# Patient Record
Sex: FEMALE | Race: WHITE | NOT HISPANIC OR LATINO | Employment: UNEMPLOYED | URBAN - METROPOLITAN AREA
[De-identification: names, ages, dates, MRNs, and addresses within clinical notes are randomized per-mention and may not be internally consistent; named-entity substitution may affect disease eponyms.]

---

## 2023-10-27 PROBLEM — G90.1 DYSAUTONOMIA (MULTI): Status: ACTIVE | Noted: 2023-10-27

## 2023-10-27 RX ORDER — SENNOSIDES 8.6 MG/1
1 TABLET ORAL DAILY
COMMUNITY

## 2023-10-27 RX ORDER — CHOLECALCIFEROL (VITAMIN D3) 25 MCG
1 TABLET ORAL DAILY
COMMUNITY

## 2023-10-27 RX ORDER — POLYETHYLENE GLYCOL 3350 17 G/17G
17 POWDER, FOR SOLUTION ORAL DAILY
COMMUNITY

## 2023-10-27 RX ORDER — DROXIDOPA 100 MG/1
1 CAPSULE ORAL DAILY
COMMUNITY
Start: 2023-01-27

## 2023-10-27 RX ORDER — CALC/MAG/B COMPLEX/D3/HERB 61
1 TABLET ORAL DAILY
COMMUNITY

## 2023-10-27 RX ORDER — CALCIUM CARBONATE 300MG(750)
1 TABLET,CHEWABLE ORAL DAILY
COMMUNITY

## 2023-10-27 RX ORDER — MELOXICAM 15 MG/1
1 TABLET ORAL DAILY
COMMUNITY
Start: 2023-10-03

## 2023-10-27 RX ORDER — ONDANSETRON 4 MG/1
1 TABLET, ORALLY DISINTEGRATING ORAL EVERY 6 HOURS
COMMUNITY
Start: 2022-12-20

## 2023-10-27 RX ORDER — LUBIPROSTONE 24 UG/1
1 CAPSULE ORAL
COMMUNITY
Start: 2023-10-18

## 2023-10-27 RX ORDER — HYDROXYCHLOROQUINE SULFATE 200 MG/1
1 TABLET, FILM COATED ORAL DAILY
COMMUNITY
Start: 2023-10-11

## 2023-10-27 RX ORDER — IVABRADINE 5 MG/1
1 TABLET, FILM COATED ORAL DAILY
COMMUNITY
Start: 2023-09-06

## 2023-10-27 RX ORDER — OSELTAMIVIR PHOSPHATE 75 MG/1
1 CAPSULE ORAL 2 TIMES DAILY
COMMUNITY
Start: 2022-12-20

## 2023-10-27 RX ORDER — PYRIDOSTIGMINE BROMIDE 60 MG/1
1 TABLET ORAL 2 TIMES DAILY
COMMUNITY
Start: 2023-10-22

## 2023-10-30 ENCOUNTER — PROCEDURE VISIT (OUTPATIENT)
Dept: OTOLARYNGOLOGY | Facility: CLINIC | Age: 18
End: 2023-10-30
Payer: COMMERCIAL

## 2023-10-30 ENCOUNTER — LAB (OUTPATIENT)
Dept: LAB | Facility: LAB | Age: 18
End: 2023-10-30
Payer: COMMERCIAL

## 2023-10-30 VITALS — SYSTOLIC BLOOD PRESSURE: 107 MMHG | TEMPERATURE: 98.3 F | DIASTOLIC BLOOD PRESSURE: 69 MMHG | HEART RATE: 83 BPM

## 2023-10-30 DIAGNOSIS — G90.1 DYSAUTONOMIA (MULTI): ICD-10-CM

## 2023-10-30 DIAGNOSIS — G90.1 DYSAUTONOMIA (MULTI): Primary | ICD-10-CM

## 2023-10-30 LAB
ANION GAP SERPL CALC-SCNC: 13 MMOL/L (ref 10–20)
BASOPHILS # BLD AUTO: 0.06 X10*3/UL (ref 0–0.1)
BASOPHILS NFR BLD AUTO: 1.4 %
BUN SERPL-MCNC: 9 MG/DL (ref 6–23)
CALCIUM SERPL-MCNC: 10.6 MG/DL (ref 8.6–10.6)
CENTROMERE B AB SER-ACNC: <0.2 AI
CERULOPLASMIN SERPL-MCNC: 25.9 MG/DL (ref 20–60)
CHLORIDE SERPL-SCNC: 106 MMOL/L (ref 98–107)
CHROMATIN AB SERPL-ACNC: <0.2 AI
CO2 SERPL-SCNC: 27 MMOL/L (ref 21–32)
CREAT SERPL-MCNC: 0.7 MG/DL (ref 0.5–1.05)
CRP SERPL-MCNC: <0.1 MG/DL
DSDNA AB SER-ACNC: 1 IU/ML
ENA JO1 AB SER QL IA: <0.2 AI
ENA RNP AB SER IA-ACNC: 0.2 AI
ENA SCL70 AB SER QL IA: <0.2 AI
ENA SM AB SER IA-ACNC: <0.2 AI
ENA SM+RNP AB SER QL IA: <0.2 AI
ENA SS-A AB SER IA-ACNC: 0.3 AI
ENA SS-B AB SER IA-ACNC: <0.2 AI
EOSINOPHIL # BLD AUTO: 0.15 X10*3/UL (ref 0–0.7)
EOSINOPHIL NFR BLD AUTO: 3.5 %
ERYTHROCYTE [DISTWIDTH] IN BLOOD BY AUTOMATED COUNT: 12.3 % (ref 11.5–14.5)
ERYTHROCYTE [SEDIMENTATION RATE] IN BLOOD BY WESTERGREN METHOD: 4 MM/H (ref 0–20)
GFR SERPL CREATININE-BSD FRML MDRD: >90 ML/MIN/1.73M*2
GLIADIN PEPTIDE IGA SER IA-ACNC: <1 U/ML
GLIADIN PEPTIDE IGG SER IA-ACNC: 1.6 U/ML
GLUCOSE P FAST SERPL-MCNC: 82 MG/DL
GLUCOSE SERPL-MCNC: 82 MG/DL (ref 74–99)
HCT VFR BLD AUTO: 38.8 % (ref 36–46)
HGB BLD-MCNC: 13.1 G/DL (ref 12–16)
IMM GRANULOCYTES # BLD AUTO: 0.01 X10*3/UL (ref 0–0.7)
IMM GRANULOCYTES NFR BLD AUTO: 0.2 % (ref 0–0.9)
LYMPHOCYTES # BLD AUTO: 1.27 X10*3/UL (ref 1.2–4.8)
LYMPHOCYTES NFR BLD AUTO: 29.3 %
MCH RBC QN AUTO: 31.2 PG (ref 26–34)
MCHC RBC AUTO-ENTMCNC: 33.8 G/DL (ref 32–36)
MCV RBC AUTO: 92 FL (ref 80–100)
MONOCYTES # BLD AUTO: 0.41 X10*3/UL (ref 0.1–1)
MONOCYTES NFR BLD AUTO: 9.5 %
NEUTROPHILS # BLD AUTO: 2.43 X10*3/UL (ref 1.2–7.7)
NEUTROPHILS NFR BLD AUTO: 56.1 %
NRBC BLD-RTO: 0 /100 WBCS (ref 0–0)
PLATELET # BLD AUTO: 318 X10*3/UL (ref 150–450)
PMV BLD AUTO: 10.2 FL (ref 7.5–11.5)
POTASSIUM SERPL-SCNC: 5 MMOL/L (ref 3.5–5.3)
PROT SERPL-MCNC: 7.4 G/DL (ref 6.4–8.2)
PROT UR-ACNC: <4 MG/DL (ref 5–25)
RBC # BLD AUTO: 4.2 X10*6/UL (ref 4–5.2)
RHEUMATOID FACT SER NEPH-ACNC: <10 IU/ML (ref 0–15)
RIBOSOMAL P AB SER-ACNC: <0.2 AI
SODIUM SERPL-SCNC: 141 MMOL/L (ref 136–145)
T3FREE SERPL-MCNC: 3.7 PG/ML (ref 3–4.7)
T4 SERPL-MCNC: 8.6 UG/DL (ref 4.5–11.1)
TTG IGA SER IA-ACNC: <1 U/ML
TTG IGG SER IA-ACNC: <1 U/ML
VIT B12 SERPL-MCNC: 981 PG/ML (ref 211–911)
WBC # BLD AUTO: 4.3 X10*3/UL (ref 4.4–11.3)

## 2023-10-30 PROCEDURE — 84155 ASSAY OF PROTEIN SERUM: CPT

## 2023-10-30 PROCEDURE — 86225 DNA ANTIBODY NATIVE: CPT

## 2023-10-30 PROCEDURE — 82542 COL CHROMOTOGRAPHY QUAL/QUAN: CPT

## 2023-10-30 PROCEDURE — 83921 ORGANIC ACID SINGLE QUANT: CPT

## 2023-10-30 PROCEDURE — 86258 DGP ANTIBODY EACH IG CLASS: CPT

## 2023-10-30 PROCEDURE — 82390 ASSAY OF CERULOPLASMIN: CPT

## 2023-10-30 PROCEDURE — 86376 MICROSOMAL ANTIBODY EACH: CPT

## 2023-10-30 PROCEDURE — 84166 PROTEIN E-PHORESIS/URINE/CSF: CPT

## 2023-10-30 PROCEDURE — 86334 IMMUNOFIX E-PHORESIS SERUM: CPT

## 2023-10-30 PROCEDURE — 86738 MYCOPLASMA ANTIBODY: CPT

## 2023-10-30 PROCEDURE — 86800 THYROGLOBULIN ANTIBODY: CPT

## 2023-10-30 PROCEDURE — 85652 RBC SED RATE AUTOMATED: CPT

## 2023-10-30 PROCEDURE — 84165 PROTEIN E-PHORESIS SERUM: CPT | Performed by: PSYCHIATRY & NEUROLOGY

## 2023-10-30 PROCEDURE — 82384 ASSAY THREE CATECHOLAMINES: CPT

## 2023-10-30 PROCEDURE — 82607 VITAMIN B-12: CPT

## 2023-10-30 PROCEDURE — 86320 SERUM IMMUNOELECTROPHORESIS: CPT | Performed by: PSYCHIATRY & NEUROLOGY

## 2023-10-30 PROCEDURE — 86255 FLUORESCENT ANTIBODY SCREEN: CPT

## 2023-10-30 PROCEDURE — 80048 BASIC METABOLIC PNL TOTAL CA: CPT

## 2023-10-30 PROCEDURE — 84446 ASSAY OF VITAMIN E: CPT

## 2023-10-30 PROCEDURE — 84481 FREE ASSAY (FT-3): CPT

## 2023-10-30 PROCEDURE — 81260 IKBKAP GENE: CPT

## 2023-10-30 PROCEDURE — 84207 ASSAY OF VITAMIN B-6: CPT

## 2023-10-30 PROCEDURE — 86431 RHEUMATOID FACTOR QUANT: CPT

## 2023-10-30 PROCEDURE — 86235 NUCLEAR ANTIGEN ANTIBODY: CPT

## 2023-10-30 PROCEDURE — 83519 RIA NONANTIBODY: CPT

## 2023-10-30 PROCEDURE — 85025 COMPLETE CBC W/AUTO DIFF WBC: CPT

## 2023-10-30 PROCEDURE — 99203 OFFICE O/P NEW LOW 30 MIN: CPT | Performed by: OTOLARYNGOLOGY

## 2023-10-30 PROCEDURE — 83918 ORGANIC ACIDS TOTAL QUANT: CPT

## 2023-10-30 PROCEDURE — 88305 TISSUE EXAM BY PATHOLOGIST: CPT

## 2023-10-30 PROCEDURE — 86140 C-REACTIVE PROTEIN: CPT

## 2023-10-30 PROCEDURE — 36415 COLL VENOUS BLD VENIPUNCTURE: CPT

## 2023-10-30 PROCEDURE — 84436 ASSAY OF TOTAL THYROXINE: CPT

## 2023-10-30 PROCEDURE — 86038 ANTINUCLEAR ANTIBODIES: CPT

## 2023-10-30 PROCEDURE — 84165 PROTEIN E-PHORESIS SERUM: CPT

## 2023-10-30 PROCEDURE — 86364 TISS TRNSGLTMNASE EA IG CLAS: CPT

## 2023-10-30 PROCEDURE — 86335 IMMUNFIX E-PHORSIS/URINE/CSF: CPT

## 2023-10-30 PROCEDURE — 82595 ASSAY OF CRYOGLOBULIN: CPT

## 2023-10-30 PROCEDURE — 84156 ASSAY OF PROTEIN URINE: CPT

## 2023-10-30 PROCEDURE — 82085 ASSAY OF ALDOLASE: CPT

## 2023-10-30 PROCEDURE — 86325 OTHER IMMUNOELECTROPHORESIS: CPT

## 2023-10-31 ENCOUNTER — TELEMEDICINE (OUTPATIENT)
Dept: NEUROLOGY | Facility: HOSPITAL | Age: 18
End: 2023-10-31
Payer: COMMERCIAL

## 2023-10-31 ENCOUNTER — HOSPITAL ENCOUNTER (OUTPATIENT)
Dept: NEUROLOGY | Facility: HOSPITAL | Age: 18
Discharge: HOME | End: 2023-10-31
Payer: COMMERCIAL

## 2023-10-31 VITALS — BODY MASS INDEX: 19.41 KG/M2 | WEIGHT: 123.02 LBS

## 2023-10-31 DIAGNOSIS — G90.1 DYSAUTONOMIA (MULTI): ICD-10-CM

## 2023-10-31 LAB
ALDOLASE SERPL-CCNC: 3.2 U/L (ref 1.2–7.6)
ANA SER QL HEP2 SUBST: NEGATIVE
THYROGLOB AB SERPL-ACNC: <0.9 IU/ML (ref 0–4)
THYROPEROXIDASE AB SERPL-ACNC: <28 IU/ML

## 2023-10-31 PROCEDURE — 11104 PUNCH BX SKIN SINGLE LESION: CPT | Performed by: PSYCHIATRY & NEUROLOGY

## 2023-10-31 PROCEDURE — 95924 ANS PARASYMP & SYMP W/TILT: CPT | Performed by: PSYCHIATRY & NEUROLOGY

## 2023-10-31 PROCEDURE — 95912 NRV CNDJ TEST 11-12 STUDIES: CPT | Performed by: PSYCHIATRY & NEUROLOGY

## 2023-10-31 PROCEDURE — 11105 PUNCH BX SKIN EA SEP/ADDL: CPT | Performed by: PSYCHIATRY & NEUROLOGY

## 2023-10-31 PROCEDURE — 95923 AUTONOMIC NRV SYST FUNJ TEST: CPT | Performed by: PSYCHIATRY & NEUROLOGY

## 2023-10-31 PROCEDURE — 95886 MUSC TEST DONE W/N TEST COMP: CPT | Performed by: PSYCHIATRY & NEUROLOGY

## 2023-10-31 PROCEDURE — 2500000005 HC RX 250 GENERAL PHARMACY W/O HCPCS: Performed by: PSYCHIATRY & NEUROLOGY

## 2023-10-31 RX ORDER — LIDOCAINE HYDROCHLORIDE AND EPINEPHRINE 20; 10 MG/ML; UG/ML
1 INJECTION, SOLUTION INFILTRATION; PERINEURAL SEE ADMIN INSTRUCTIONS
Status: SHIPPED | OUTPATIENT
Start: 2023-10-31

## 2023-10-31 RX ADMIN — LIDOCAINE HYDROCHLORIDE,EPINEPHRINE BITARTRATE 1 ML: 20; .01 INJECTION, SOLUTION INFILTRATION; PERINEURAL at 16:02

## 2023-10-31 ASSESSMENT — PAIN SCALES - GENERAL: PAINLEVEL_OUTOF10: 0 - NO PAIN

## 2023-10-31 NOTE — PROGRESS NOTES
ENT Outpatient Consultation    Chief Complaint: lip biopsy to rule out sjogrens  History Of Present Illness  Ana Barnett is a 18 y.o. female presents for lower lip biopsy to rule out Sjogren's disease.  She is from [place] and is in town to undergo evaluation by Dr. Mojica.  He requested lower lip biopsy as part of his work-up.  She denies any drug allergies.     Past Medical History  She has no past medical history on file.  Patient Active Problem List   Diagnosis    Dysautonomia (CMS/HCC)      Surgical History  She has no past surgical history on file.     Social History  She has no history on file for tobacco use, alcohol use, and drug use.    Family History  Family History   Family history unknown: Yes        Allergies  Patient has no known allergies.     Physical Exam:  CONSTITUTIONAL:  No acute distress  VOICE:  No hoarseness or other abnormality  RESPIRATION:  Breathing comfortably, no stridor  CV:  No clubbing/cyanosis/edema in hands  NEURO:  Alert and oriented times 3, Cranial nerves II-XII grossly intact and symmetric bilaterally  HEAD AND FACE:  Symmetric facial features, no masses or lesions, sinuses non-tender to palpation  SALIVARY GLANDS:  Parotid and submandibular glands normal bilaterally  EARS:  Normal external ears  NOSE:  External nose midline, anterior rhinoscopy is normal with limited visualization to the anterior aspect of the interior turbinates, no bleeding or drainage, no lesions  ORAL CAVITY/OROPHARYNX/LIPS:  Normal mucous membranes, normal floor of mouth/tongue/OP, no masses or lesions  PHARYNGEAL WALLS:  No masses or lesions  NECK/LYMPH:  No LAD    Procedure: Biopsy of lower lip    The lower lip was injected with 1% lidocaine and 1 100,000 epinephrine.  After local anesthesia took effect curved iris scissors were used to cut the mucosa.  Several clusters of minor salivary gland tissue were dissected and placed in formalin.  3-0 Vicryl was used to reapproximate the mucosa.  Patient  tolerated the procedure well.  She had mild nausea and dizziness at the end of the procedure however this resolved prior to her leaving the office     Last Recorded Vitals  Blood pressure 107/69, pulse 83, temperature 36.8 °C (98.3 °F).      Assessment and Plan  18 y.o. female  referred for biopsy of her lower lip to rule out Sjogren's disease.  Biopsy obtained today.  I will notify her once pathology is final.  She will follow-up with Dr. Mojica to discuss further    Yakov Jones MD

## 2023-10-31 NOTE — PROGRESS NOTES
Skin Biopsy Procedure Note      Ana Barnett [id number] presented today for skin biopsy to quantify and compare epidermal nerve fiber density from 2 proximal sites (hip and distal thigh) with a distal site (ankle).   Indications: Symptoms of small fiber neuropathy   Potential risks were explained: Including but not limited to excessive bleeding, discomfort at injection site, and infection   Procedure: The patient was placed in the left lateral recumbent position. The skin was prepped with Chlorhexidine in 3 locations, all on the right side: 10 cm proximal to lateral malleolus, 7 cm proximal to the knee and 7 cm distal to the greater trochanter. Local anesthesia was achieved with xylocaine 2% with epinephrine. The 3 punch biopsies were then taken without difficulty, and placed in fixative solution. The biopsy sites were dried using sterile gauze. Band-aids were applied at all sites. The specimens were shipped via [employer] the same afternoon to Dr. Chau Gonzalez at [facility].  Complications: There were no immediate complications.   Results: Will be available in 21-30 days and will be scanned in the chart.   Referring Provider, Leah Mojica MD  [id number]  [place] [address] will be notified at that time via fax and/or in basket message.    Leah Urrutia MD

## 2023-11-01 LAB
M PNEUMO IGG SER IA-ACNC: 0.08 U/L
M PNEUMO IGM SER IA-ACNC: 0.07 U/L
PCA IGG SER-ACNC: 6.1 UNITS (ref 0–24.9)

## 2023-11-02 LAB
A-TOCOPHEROL VIT E SERPL-MCNC: 11.2 MG/L (ref 5.5–18)
BETA+GAMMA TOCOPHEROL SERPL-MCNC: 0.3 MG/L (ref 0–6)
METHYLMALONATE SERPL-SCNC: <0.1 UMOL/L (ref 0–0.4)
PYRIDOXAL PHOS SERPL-SCNC: 73.5 NMOL/L (ref 20–125)

## 2023-11-03 LAB — SCAN RESULT: NORMAL

## 2023-11-04 LAB
3OH-DODECANOYLCARN SERPL-SCNC: 0.01 UMOL/L
3OH-ISOVALERYLCARN SERPL-SCNC: <0.01 UMOL/L
3OH-LINOLEOYLCARN SERPL-SCNC: <0.01 UMOL/L
3OH-OLEOYLCARN SERPL-SCNC: <0.01 UMOL/L
3OH-PALMITOLEYLCARN SERPL-SCNC: <0.01 UMOL/L
3OH-PALMITOYLCARN SERPL-SCNC: <0.01 UMOL/L
3OH-STEAROYLCARN SERPL-SCNC: <0.01 UMOL/L
3OH-TDECANOYLCARN SERPL-SCNC: <0.01 UMOL/L
3OH-TDECENOYLCARN SERPL-SCNC: <0.01 UMOL/L
ACETYLCARN SERPL-SCNC: 5.28 UMOL/L (ref 2.93–15.06)
ACYLCARNITINE PATTERN SERPL-IMP: NORMAL
BUTYRYL+ISOBUTYRYLCARN SERPL-SCNC: 0.13 UMOL/L
CARN ESTERS SERPL-SCNC: 7 UMOL/L (ref 3–38)
CARN ESTERS/C0 SERPL-SRTO: 0.2 RATIO (ref 0.1–0.9)
CARNITINE FREE SERPL-SCNC: 30 UMOL/L (ref 22–63)
CARNITINE SERPL-SCNC: 37 UMOL/L (ref 31–78)
CRYOGLOB SER QL: NORMAL
DECANOYLCARN SERPL-SCNC: 0.11 UMOL/L
DECENOYLCARN SERPL-SCNC: 0.06 UMOL/L
DODECANOYLCARN SERPL-SCNC: 0.02 UMOL/L
DODECENOYLCARN SERPL-SCNC: 0.02 UMOL/L
GLUTARYLCARN SERPL-SCNC: 0.09 UMOL/L
HEXANOYLCARN SERPL-SCNC: 0.04 UMOL/L
ISOVALERYL+MEBUTYRYLCARN SERPL-SCNC: 0.05 UMOL/L
LINOLEOYLCARN SERPL-SCNC: 0.03 UMOL/L
OCTANOYLCARN SERPL-SCNC: 0.08 UMOL/L
OCTENOYLCARN SERPL-SCNC: 0.04 UMOL/L
OLEOYLCARN SERPL-SCNC: 0.04 UMOL/L
PALMITOLEYLCARN SERPL-SCNC: 0.01 UMOL/L
PALMITOYLCARN SERPL-SCNC: 0.05 UMOL/L
PROPIONYLCARN SERPL-SCNC: 0.25 UMOL/L
STEAROYLCARN SERPL-SCNC: 0.02 UMOL/L
TDECADIENOYLCARN SERPL-SCNC: 0.01 UMOL/L
TDECANOYLCARN SERPL-SCNC: 0.01 UMOL/L
TDECENOYLCARN SERPL-SCNC: 0.01 UMOL/L

## 2023-11-05 LAB
ALBUMIN MFR UR ELPH: 31.6 %
ALBUMIN: 4.4 G/DL (ref 3.4–5)
ALPHA 1 GLOBULIN: 0.3 G/DL (ref 0.2–0.6)
ALPHA 2 GLOBULIN: 0.8 G/DL (ref 0.4–1.1)
ALPHA1 GLOB MFR UR ELPH: 12.5 %
ALPHA2 GLOB MFR UR ELPH: 11.8 %
B-GLOBULIN MFR UR ELPH: 13.4 %
BETA GLOBULIN: 0.8 G/DL (ref 0.5–1.2)
GAMMA GLOB MFR UR ELPH: 30.7 %
GAMMA GLOBULIN: 1.2 G/DL (ref 0.5–1.4)
IMMUNOFIXATION COMMENT: NORMAL
IMMUNOFIXATION COMMENT: NORMAL
PATH REVIEW - SERUM IMMUNOFIXATION: NORMAL
PATH REVIEW - URINE IMMUNOFIXATION: NORMAL
PATH REVIEW-SERUM PROTEIN ELECTROPHORESIS: NORMAL
PATH REVIEW-URINE PROTEIN ELECTROPHORESIS: NORMAL
PROTEIN ELECTROPHORESIS COMMENT: NORMAL
URINE ELECTROPHORESIS COMMENT: NORMAL

## 2023-11-06 LAB
2OXO3ME-VALERATE/CREAT UR-SRTO: NOT DETECTED (ref 0–10)
2OXOISOCAPROATE/CREAT UR-SRTO: NOT DETECTED (ref 0–4)
2OXOISOVALERATE/CREAT UR-SRTO: NOT DETECTED (ref 0–4)
4OH-PHENYLACETATE/CREAT UR-SRTO: 6 (ref 0–25)
4OH-PHENYLLACTATE/CREAT UR-SRTO: NOT DETECTED (ref 0–4)
4OH-PHENYLPYRUVATE/CREAT UR-SRTO: NOT DETECTED (ref 0–2)
A-KETOGLUT/CREAT UR-SRTO: 14 (ref 0–75)
ACETOACET/CREAT UR-SRTO: NOT DETECTED (ref 0–4)
ADIPATE/CREAT UR-SRTO: 2 (ref 0–35)
B-OH-BUTYR/CREAT UR-SRTO: 1 (ref 0–4)
CREAT UR-MCNC: 22 MG/DL
DOPAMINE SERPL-MCNC: <30 PG/ML (ref 0–48)
EPINEPH PLAS-MCNC: 52 PG/ML (ref 0–62)
ETHYLMALONATE/CREAT UR-SRTO: 2 (ref 0–4)
FUMARATE/CREAT UR-SRTO: NOT DETECTED (ref 0–4)
LACTATE/CREAT UR-SRTO: 30 (ref 0–50)
METHYLMALONATE/CREAT UR-SRTO: 1 (ref 0–5)
NOREPINEPH PLAS-MCNC: 277 PG/ML (ref 0–874)
ORGANIC ACIDS PATTERN UR-IMP: NORMAL
PYRUVATE/CREAT UR-SRTO: 11 (ref 0–15)
SEBACATE/CREAT UR-SRTO: NOT DETECTED (ref 0–3)
SUBERATE/CREAT UR-SRTO: NOT DETECTED (ref 0–3)
SUCCINATE/CREAT UR-SRTO: 13 (ref 0–20)
SUCCINYLACETONE/CREAT UR-SRTO: NOT DETECTED (ref 0–0)

## 2023-11-08 LAB
AMPHIPHYSIN IGG SER QL IA: NEGATIVE
ANNOTATION COMMENT IMP: NORMAL
CV2 AB SERPL QL IF: NEGATIVE
GLIAL NUC TYPE 1 AB SER QL IF: NEGATIVE
HU1 AB SER QL: NEGATIVE
HU2 AB SER QL IF: NEGATIVE
HU3 AB SER QL: NEGATIVE
PARANEOPLASTIC AB SER-IMP: NORMAL
PCA-1 AB SER QL IF: NEGATIVE
PCA-2 AB SER QL IF: NEGATIVE
PCA-TR AB SER QL IF: NEGATIVE
VGCC-P/Q BIND IGG+IGM SER IA-SCNC: 0 NMOL/L
VGKC IGG+IGM SER IA-SCNC: 0 NMOL/L

## 2023-11-09 LAB — SCAN RESULT: NORMAL

## 2023-11-13 LAB
LABORATORY COMMENT REPORT: NORMAL
PATH REPORT.FINAL DX SPEC: NORMAL
PATH REPORT.GROSS SPEC: NORMAL
PATH REPORT.RELEVANT HX SPEC: NORMAL
PATH REPORT.TOTAL CANCER: NORMAL

## 2023-12-21 ENCOUNTER — TELEMEDICINE (OUTPATIENT)
Dept: NEUROLOGY | Facility: HOSPITAL | Age: 18
End: 2023-12-21
Payer: COMMERCIAL

## 2023-12-21 DIAGNOSIS — G90.A POTS (POSTURAL ORTHOSTATIC TACHYCARDIA SYNDROME): ICD-10-CM

## 2023-12-21 DIAGNOSIS — Q79.62 EHLERS-DANLOS SYNDROME, TYPE 3 (HHS-HCC): Primary | ICD-10-CM

## 2023-12-21 DIAGNOSIS — R55 VASODEPRESSOR SYNCOPE: ICD-10-CM

## 2023-12-21 PROCEDURE — 99215 OFFICE O/P EST HI 40 MIN: CPT | Mod: 95 | Performed by: PSYCHIATRY & NEUROLOGY

## 2023-12-21 PROCEDURE — 99215 OFFICE O/P EST HI 40 MIN: CPT | Performed by: PSYCHIATRY & NEUROLOGY

## 2023-12-21 NOTE — PROGRESS NOTES
[facility] AUTONOMIC PROGRAM    AUTONOMIC FOLLOW-UP VISIT      Leah Urrutia MD  Senior Attending Physician- [facility]  [facility]  Director, Autonomic Program  Medical Director, [facility] [place] [address]  Office: [phone]  Assistant: Lucinda Stanford (email: [email or web address])     Patient Information     Medical Record Number: [id number]   YOB: 2005    Home Address: [address] [place] [address]   Phone Number:  [phone]    Primary Care Physician:     Referring Physician: No referring provider defined for this encounter.    Patient accompanied by: n/a. Present by herself.   In addition to attending physician, patient seen by Asa Trinh MD, Neuromuscular Fellow    Previous Compass 31 (date 7/27/23):  69.62    IMPRESSION:  Ms. NAVIN LOPEZ is a 18 year y/o white woman presenting to the  Autonomic Program for evaluation of dysautonomia. 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 Since onset of symptoms in ~2017, she has experienced progressively worsening and debilitating lightheadedness, loss of limb control, neck tone, and falls, with maintaining any upright posture or activities.     She underwent skin and lip biopsies, extensive laboratory, autonomic, electrodiagnostic testing from 10/29-10/31.   Her autonomic testing was consistent with postural orthostatic tachycardia syndrome (POTS) with hyperadrenergic features, and also followed by a possible transient vasodepressor event. Her QSART was normal. Her EMG+NCS did not demonstrate evidence of a peripheral polyneuropathy. Her skin biopsy showed only mildly and borderline reduced intraepidermal nerve fiber density in the Right distal  leg and thigh, respectively. Her lip biopsy showed minimal chronic inflammatory infiltrate with a focus score of 0, essentially negative.     Given the extensive testing and lack of any clear evidence of an underlying autoimmune process, we suspect her symptoms to likely be attributed to her hypermobility syndrome, which was diagnosed clinically.     Plan:  She is agreeable to try conservative measures which were discussed during today's visit.   Specifically, she was counseled on measuring, keeping track of, and scheduling regular sodium intake (at least 6g per day) and fluid intake (at least 100oz per day), jogging in waist/chest high water at least once a week to start and ideally once a day, elevating the head of bed 45 degrees and sleeping in that position, and consistently wearing waist high compression, targeting 30-40mmHg.     She continues to take plaquenil which she feels is quite beneficial in giving her ~2 hours of symptomatic control  She continues her corlanor 2.5mg BID and mestinon 60mg BID.    She continues to follow with Dr. Castro as well for her symptoms and for the above medications.     Asa Trinh MD  Neuromuscular Fellow    The patient was seen and discussed with Dr. Leah Urrutia, Neuromuscular Attending.     --------------------------------------    History of Present Illness:  Ms. NAVIN LOPEZ is a 18 year y/o white woman presenting to the  Autonomic Program for follow up of dysautonomia. 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      Interval History details   She underwent skin and lip biopsies, extensive laboratory, autonomic, electrodiagnostic testing from 10/29-10/31.   Her autonomic testing was consistent with postural orthostatic tachycardia syndrome (POTS) with hyperadrenergic features, also followed by a possible transient vasodepressor  event. Her QSART was normal. Her EMG+NCS did not demonstrate evidence of a peripheral polyneuropathy. Her skin biopsy showed only mildly and borderline reduced intraepidermal nerve fiber density in the right distal leg and thigh, respectively. Her lip biopsy showed minimal chronic inflammatory infiltrate with a focus score of 0, essentially negative.     Overall her symptoms are about similar compared to when we evaluated her in July 2023.   Her day is limited in amount of time she feels she is capable of being out of bed.  She continues physical therapy.     She roughly estimates somewhere between 80-100oz of fluids per day.   She is liberal with adding salt to her food, but not sure how much exactly she is consuming per day.   She wears compression knee high socks, has waist high, but does not consistently wear and not sure about degree of compression.   She is not currently engaging in any aquatic exercises such as jogging in the pool in waist/chest high water.     She continues to take plaquenil which she feels is quite beneficial in giving her ~2 hours of symptomatic control.   She continues her corlanor 2.5mg BID and mestinon 60mg BID.    She continues to follow with Dr. Castro for the above medications.     Initial History  History details:  Generally healthy up until fifth grade. Suffered viral illness that caused significant weakness where she had trouble walking and going up the stairs. After 6 weeks of PT made full recovery and regularly biking 5 to 10 miles per day. Around eighth grade started developing pain in hands worsened by playing piano, riding a bike, eventually restricting her from doing either of those activities due to pain. Started to experience nausea in the morning. At this time, began to have episodes of syncope precipitated by a prodrome of her vision fading, feeling very lightheaded, then followed by quick loss of consciousness, falling to the floor.     Because of the severity of her  symptoms, she completed most of her high school online/virtually at home. She spends the majority of her day in a reclined position or supine. With any upright activity, even with sitting upright, she has a time-limit of around 10 to 15 minutes, before experiencing significant lightheadedness, weakness and inability to control all her limbs, loss of ability to keep head upright, cognitive impairment. She feels lightheaded and her legs shaking just with standing. However, she is able to stand with assistance and adaptive maneuvers to change positions very slowly, and has a service dog to keep herself upright while ambulating. However she still collapses as frequently as 5-10 times per day, and several syncopal episodes per week.     With meals, can notice the symptoms after eating 1 plate of food, though this is also in the setting of sitting upright.     Has tried compressive garments up to the proximal thighs, but finds this irritates her neuropathic symptoms. She experiences burning, dry needle/shooting pain in her upper extremities from fingertips to elbows, and feet up to knees. Has not tried abdominal binder.     Also has persistent daily headache, worsened by upright positional changes.     Will also notice sometimes her hands can look purple when she is upright for longer period of time, this can also happen with cold temperatures.     Does have increased sensitivity to bright lights, and wears sunglasses in the clinic room today.     She denies sicca symptoms.     Previous work-up: EMG 2021 (unremarkable). Most recent autonomic testing (tilt table, Valsalva, deep breathing, TST) in 2019 was reported as normal in the report.     Currently taking pyridostigmine and Corlanor, which replaced metoprolol. Has tried droxidopa, but this worsened her constant daily headache.    Exam:  Limited neurological examination given virtual nature of visit.   Voice is clear, alert, no acute distress. Language is intact for  comprehension and expression. Recent and remote memory intact. Face appears symmetric. She is able to lift her arms up to take notes on a notepad nearby and write with her right hand.     At one point during the interval she stopped talking and seemed struggle with finding the right words and seemed somewhat confused, but the self-resolved after 20-30 seconds.     Results:  We personally reviewed the following:  - the data of her laboratory studies from 10/30.   - the data of her EMG+NCS from 10/31.   - the data of her autonomic testing from 10/31.  - the report/results of her lip biopsy from 10/30.   - the report/results of her skin biopsies from 10/31

## 2023-12-23 PROBLEM — Q79.62 EHLERS-DANLOS SYNDROME, TYPE 3 (HHS-HCC): Status: ACTIVE | Noted: 2023-12-23
